# Patient Record
Sex: MALE | HISPANIC OR LATINO | URBAN - NONMETROPOLITAN AREA
[De-identification: names, ages, dates, MRNs, and addresses within clinical notes are randomized per-mention and may not be internally consistent; named-entity substitution may affect disease eponyms.]

---

## 2023-09-14 ENCOUNTER — WEB ENCOUNTER (OUTPATIENT)
Dept: URBAN - NONMETROPOLITAN AREA CLINIC 4 | Facility: CLINIC | Age: 28
End: 2023-09-14

## 2023-09-14 ENCOUNTER — OFFICE VISIT (OUTPATIENT)
Dept: URBAN - NONMETROPOLITAN AREA CLINIC 4 | Facility: CLINIC | Age: 28
End: 2023-09-14
Payer: COMMERCIAL

## 2023-09-14 ENCOUNTER — DASHBOARD ENCOUNTERS (OUTPATIENT)
Age: 28
End: 2023-09-14

## 2023-09-14 VITALS
TEMPERATURE: 98.1 F | SYSTOLIC BLOOD PRESSURE: 116 MMHG | HEART RATE: 65 BPM | DIASTOLIC BLOOD PRESSURE: 79 MMHG | WEIGHT: 174 LBS | HEIGHT: 66 IN | BODY MASS INDEX: 27.97 KG/M2

## 2023-09-14 DIAGNOSIS — K59.09 CHANGE IN BOWEL MOVEMENTS INTERMITTENT CONSTIPATION. URGENCY IN THE MORNING.: ICD-10-CM

## 2023-09-14 DIAGNOSIS — K62.5 RECTAL BLEEDING: ICD-10-CM

## 2023-09-14 PROBLEM — 14760008: Status: ACTIVE | Noted: 2023-09-14

## 2023-09-14 PROCEDURE — 99204 OFFICE O/P NEW MOD 45 MIN: CPT | Performed by: REGISTERED NURSE

## 2023-09-14 NOTE — HPI-TODAY'S VISIT:
9/14/23: Pt is a 27 yo male with no significant PMH who is self referred for evaluation of rectal bleeding   Pt reports intermittent rectal bleeding for past couple of months. Notices blood in toilet bowl and on toilet paper. Admits to straining and hard stools. Also reports bloating and gas. Denies abdominal pain, rectal pain or diarrhea. Denies FHx of CRC or polyps. He is a social drinker and smoker. Denies drug use. Drinks several energy drinks. Does not have a PCP.

## 2023-09-15 LAB
A/G RATIO: 1.6
ALBUMIN: 4.6
ALKALINE PHOSPHATASE: 66
ALT (SGPT): 37
AST (SGOT): 18
BILIRUBIN, TOTAL: 0.6
BUN/CREATININE RATIO: (no result)
BUN: 9
CALCIUM: 10
CARBON DIOXIDE, TOTAL: 27
CHLORIDE: 104
CREATININE: 0.85
EGFR: 123
GLOBULIN, TOTAL: 2.9
GLUCOSE: 76
HEMATOCRIT: 44.8
HEMOGLOBIN: 14.7
MCH: 30.8
MCHC: 32.8
MCV: 93.7
MPV: 10.4
PLATELET COUNT: 315
POTASSIUM: 4.8
PROTEIN, TOTAL: 7.5
RDW: 12.8
RED BLOOD CELL COUNT: 4.78
SODIUM: 139
TSH: 1.45
WHITE BLOOD CELL COUNT: 6.4

## 2023-10-05 ENCOUNTER — OFFICE VISIT (OUTPATIENT)
Dept: URBAN - NONMETROPOLITAN AREA CLINIC 4 | Facility: CLINIC | Age: 28
End: 2023-10-05

## 2023-10-11 ENCOUNTER — OFFICE VISIT (OUTPATIENT)
Dept: URBAN - NONMETROPOLITAN AREA CLINIC 4 | Facility: CLINIC | Age: 28
End: 2023-10-11